# Patient Record
Sex: MALE | Race: WHITE | Employment: FULL TIME | URBAN - METROPOLITAN AREA
[De-identification: names, ages, dates, MRNs, and addresses within clinical notes are randomized per-mention and may not be internally consistent; named-entity substitution may affect disease eponyms.]

---

## 2017-03-02 ENCOUNTER — OFFICE VISIT (OUTPATIENT)
Dept: FAMILY MEDICINE CLINIC | Age: 42
End: 2017-03-02

## 2017-03-02 VITALS
HEIGHT: 72 IN | TEMPERATURE: 96.7 F | SYSTOLIC BLOOD PRESSURE: 125 MMHG | RESPIRATION RATE: 16 BRPM | WEIGHT: 168 LBS | HEART RATE: 62 BPM | OXYGEN SATURATION: 100 % | DIASTOLIC BLOOD PRESSURE: 75 MMHG | BODY MASS INDEX: 22.75 KG/M2

## 2017-03-02 DIAGNOSIS — R55 VASOVAGAL EPISODE: Primary | ICD-10-CM

## 2017-03-02 RX ORDER — EFINACONAZOLE 100 MG/ML
SOLUTION TOPICAL
COMMUNITY
Start: 2017-02-16 | End: 2018-08-29 | Stop reason: ALTCHOICE

## 2017-03-02 NOTE — PROGRESS NOTES
No previous tinnitus except with loud music. No h/o syncope. Lots of stress at work. Not a stressful conversation. Not hot. Past few weeks heightened stress from promotion and more work. Pt denies feeling panicked at time of incidence. Had sudden weakness and tunnel vision and resolved w/i seconds. Recurred w/i a few minutes lasted seconds but second episode became diaphoretic. Went to sit down. BP in clinic was low nml. Nurse history read and confirmed by patient. Visit Vitals    /75 (BP 1 Location: Left arm, BP Patient Position: Sitting)    Pulse 62    Temp 96.7 °F (35.9 °C)    Resp 16    Ht 6' (1.829 m)    Wt 168 lb (76.2 kg)    SpO2 100%    BMI 22.78 kg/m2       Patient alert and cooperative. Ears - canals, TMs clear. Assessment:  1. History consistent with vasovagal episode. Plan:  1. No specific therapy. Advised to keep well hydrated, can salt food to keep blood pressure up. 2. If recurrent episode will need cardiac workup. 3. Follow here otherwise prn.

## 2017-03-02 NOTE — PROGRESS NOTES
No pain but has a ringing in his left ear for several weeks  Had an issue last week at work and went to his company health center for dizziness-has copy of EKG they did. Was standing talking to a co-worker when he got sweaty-dizzy and weak in his knees with \"tunnel vision\". This lasted only a brief period of time but had 2 quick episodes together. No chest pain with this episode.  Fax request for the record sent to fax number [phone]  Right to Dec[employer] booklet to patient  Non smoker with no plans to start

## 2018-08-22 ENCOUNTER — LAB ONLY (OUTPATIENT)
Dept: FAMILY MEDICINE CLINIC | Age: 43
End: 2018-08-22

## 2018-08-22 DIAGNOSIS — Z00.00 LABORATORY EXAM ORDERED AS PART OF ROUTINE GENERAL MEDICAL EXAMINATION: ICD-10-CM

## 2018-08-22 DIAGNOSIS — E55.9 VITAMIN D DEFICIENCY: ICD-10-CM

## 2018-08-22 DIAGNOSIS — E78.00 PURE HYPERCHOLESTEROLEMIA: Primary | ICD-10-CM

## 2018-08-23 ENCOUNTER — TELEPHONE (OUTPATIENT)
Dept: FAMILY MEDICINE CLINIC | Age: 43
End: 2018-08-23

## 2018-08-23 LAB
25(OH)D3+25(OH)D2 SERPL-MCNC: 34.8 NG/ML (ref 30–100)
ALBUMIN SERPL-MCNC: 4.8 G/DL (ref 3.5–5.5)
ALBUMIN/GLOB SERPL: 2.5 {RATIO} (ref 1.2–2.2)
ALP SERPL-CCNC: 64 IU/L (ref 39–117)
ALT SERPL-CCNC: 16 IU/L (ref 0–44)
APPEARANCE UR: CLEAR
AST SERPL-CCNC: 15 IU/L (ref 0–40)
BASOPHILS # BLD AUTO: 0 X10E3/UL (ref 0–0.2)
BASOPHILS NFR BLD AUTO: 1 %
BILIRUB SERPL-MCNC: 0.4 MG/DL (ref 0–1.2)
BILIRUB UR QL STRIP: NEGATIVE
BUN SERPL-MCNC: 15 MG/DL (ref 6–24)
BUN/CREAT SERPL: 12 (ref 9–20)
CALCIUM SERPL-MCNC: 9.4 MG/DL (ref 8.7–10.2)
CHLORIDE SERPL-SCNC: 102 MMOL/L (ref 96–106)
CHOLEST SERPL-MCNC: 193 MG/DL (ref 100–199)
CO2 SERPL-SCNC: 23 MMOL/L (ref 20–29)
COLOR UR: YELLOW
CREAT SERPL-MCNC: 1.23 MG/DL (ref 0.76–1.27)
EOSINOPHIL # BLD AUTO: 0.2 X10E3/UL (ref 0–0.4)
EOSINOPHIL NFR BLD AUTO: 5 %
ERYTHROCYTE [DISTWIDTH] IN BLOOD BY AUTOMATED COUNT: 14.2 % (ref 12.3–15.4)
GLOBULIN SER CALC-MCNC: 1.9 G/DL (ref 1.5–4.5)
GLUCOSE SERPL-MCNC: 92 MG/DL (ref 65–99)
GLUCOSE UR QL: NEGATIVE
HCT VFR BLD AUTO: 46.5 % (ref 37.5–51)
HDLC SERPL-MCNC: 53 MG/DL
HGB BLD-MCNC: 15 G/DL (ref 13–17.7)
HGB UR QL STRIP: NEGATIVE
IMM GRANULOCYTES # BLD: 0 X10E3/UL (ref 0–0.1)
IMM GRANULOCYTES NFR BLD: 0 %
INTERPRETATION, 910389: NORMAL
KETONES UR QL STRIP: NEGATIVE
LDLC SERPL CALC-MCNC: 118 MG/DL (ref 0–99)
LEUKOCYTE ESTERASE UR QL STRIP: NEGATIVE
LYMPHOCYTES # BLD AUTO: 1.5 X10E3/UL (ref 0.7–3.1)
LYMPHOCYTES NFR BLD AUTO: 36 %
MCH RBC QN AUTO: 28 PG (ref 26.6–33)
MCHC RBC AUTO-ENTMCNC: 32.3 G/DL (ref 31.5–35.7)
MCV RBC AUTO: 87 FL (ref 79–97)
MICRO URNS: NORMAL
MONOCYTES # BLD AUTO: 0.3 X10E3/UL (ref 0.1–0.9)
MONOCYTES NFR BLD AUTO: 8 %
NEUTROPHILS # BLD AUTO: 2.1 X10E3/UL (ref 1.4–7)
NEUTROPHILS NFR BLD AUTO: 50 %
NITRITE UR QL STRIP: NEGATIVE
PH UR STRIP: 5 [PH] (ref 5–7.5)
PLATELET # BLD AUTO: 197 X10E3/UL (ref 150–379)
POTASSIUM SERPL-SCNC: 4.1 MMOL/L (ref 3.5–5.2)
PROT SERPL-MCNC: 6.7 G/DL (ref 6–8.5)
PROT UR QL STRIP: NEGATIVE
RBC # BLD AUTO: 5.36 X10E6/UL (ref 4.14–5.8)
SODIUM SERPL-SCNC: 142 MMOL/L (ref 134–144)
SP GR UR: 1.02 (ref 1–1.03)
TRIGL SERPL-MCNC: 112 MG/DL (ref 0–149)
TSH SERPL DL<=0.005 MIU/L-ACNC: 1.7 UIU/ML (ref 0.45–4.5)
UROBILINOGEN UR STRIP-MCNC: 0.2 MG/DL (ref 0.2–1)
VLDLC SERPL CALC-MCNC: 22 MG/DL (ref 5–40)
WBC # BLD AUTO: 4.1 X10E3/UL (ref 3.4–10.8)

## 2018-08-24 NOTE — TELEPHONE ENCOUNTER
Writer called patient regarding lab results. Patient did not answer. Writer left  requesting phone call back.

## 2018-08-24 NOTE — TELEPHONE ENCOUNTER
Spoke with patient after obtaining 2 patient identifiers  Lab results given.  Patient verbalized understanding

## 2018-08-29 ENCOUNTER — OFFICE VISIT (OUTPATIENT)
Dept: FAMILY MEDICINE CLINIC | Age: 43
End: 2018-08-29

## 2018-08-29 VITALS
HEART RATE: 64 BPM | BODY MASS INDEX: 22.37 KG/M2 | OXYGEN SATURATION: 98 % | WEIGHT: 165.2 LBS | HEIGHT: 72 IN | SYSTOLIC BLOOD PRESSURE: 125 MMHG | RESPIRATION RATE: 18 BRPM | TEMPERATURE: 96.5 F | DIASTOLIC BLOOD PRESSURE: 75 MMHG

## 2018-08-29 DIAGNOSIS — E55.9 VITAMIN D DEFICIENCY: ICD-10-CM

## 2018-08-29 DIAGNOSIS — E78.00 PURE HYPERCHOLESTEROLEMIA: ICD-10-CM

## 2018-08-29 DIAGNOSIS — Z00.00 PHYSICAL EXAM: Primary | ICD-10-CM

## 2018-08-29 DIAGNOSIS — Z82.49 FH: HEART ATTACK: ICD-10-CM

## 2018-08-29 NOTE — MR AVS SNAPSHOT
[address] [place] [address] 
 
 
 [id number] [place] [address] [address] [address] 
[phone] Patient: Keyona Fong MRN:  [id number] Visit Information Date & Time Provider Department Dept. Phone Encounter #  
 8/29/2018  3:40 PM Karen Raya MD [facility] [phone] [id number] Follow-up Instructions Return in about 2 years (around 8/29/2020) for [facility], [facility]. Upcoming Health Maintenance Date Due Influenza Age 5 to Adult 9/1/2019* DTaP/Tdap/Td series (2 - Td) 6/1/2026 *Topic was postponed. The date shown is not the original due date. Allergies as of 8/29/2018  Review Complete On: 8/29/2018 By: Karen Raya MD  
  
 Severity Noted Reaction Type Reactions Armond  08/29/2018    Rash Rash Current Immunizations  Reviewed on 6/1/2016 Name Date Hep A Vaccine 3/21/2004, 9/8/2003 Hep B Vaccine 3/1/2004 Influenza Vaccine 12/14/2016 12:00 AM, 10/17/2014 12:00 AM  
 Td 9/8/2003 Tdap 6/1/2016 Not reviewed this visit You Were Diagnosed With   
  
 Codes Comments Physical exam    -  Primary ICD-10-CM: Z00.00 ICD-9-CM: V70.9 Pure hypercholesterolemia     ICD-10-CM: E78.00 ICD-9-CM: 272.0 Vitamin D deficiency     ICD-10-CM: E55.9 ICD-9-CM: 268.9 FH: heart attack     ICD-10-CM: Z82.49 
ICD-9-CM: V17.3 Vitals BP Pulse Temp Resp Height(growth percentile) Weight(growth percentile) 125/75 (BP 1 Location: Right arm, BP Patient Position: Sitting) 64 96.5 °F (35.8 °C) (Oral) 18 6' (1.829 m) 165 lb 3.2 oz (74.9 kg) SpO2 BMI Smoking Status 98% 22.41 kg/m2 Never Smoker BMI and BSA Data Body Mass Index Body Surface Area  
 22.41 kg/m 2 1.95 m 2 Preferred Pharmacy Pharmacy Name Phone [id number] [place] [phone] Your Updated Medication List  
  
   
 This list is accurate as of 8/29/18  4:10 PM.  Always use your most recent med list. KRILL OIL PO Take  by mouth. VITAMIN D3 1,000 unit tablet Generic drug:  cholecalciferol Take  by mouth daily. Follow-up Instructions Return in about 2 years (around 8/29/2020) for [facility], labs. [facility]! Dear NIDHI: Thank you for requesting a [employer] account. Our records indicate that you already have an active [employer] account. You can access your account anytime at [email or web address] Did you know that you can access your hospital and ER discharge instructions at any time in [employer]? You can also review all of your test results from your hospital stay or ER visit. Additional Information If you have questions, please visit the Frequently Asked Questions section of the [employer] website at [email or web address] Remember, [employer] is NOT to be used for urgent needs. For medical emergencies, dial 911. Now available from your iPhone and Android! Please provide this summary of care documentation to your next provider. Your primary care clinician is listed as [id number] If you have any questions after today's visit, please call [phone].

## 2018-08-29 NOTE — PROGRESS NOTES
HISTORY OF PRESENT ILLNESS Raúl Cheng is a 39 y.o. male. HPI Here for Health system. 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 No colonoscopy yet. Had heart scan '16 with 0 score. No signif hx past yr. Patient Active Problem List  
Diagnosis Code  Pure hypercholesterolemia E78.00  Vitamin D deficiency E55.9  FH: heart attack Z82.49 Current Outpatient Prescriptions Medication Sig Dispense Refill  cholecalciferol (VITAMIN D3) 1,000 unit tablet Take  by mouth daily.  KRILL OIL PO Take  by mouth. Allergies Allergen Reactions  Armond Rash Rash Past Medical History:  
Diagnosis Date  Dizziness and giddiness 02/22/2017  
 episode at work and notes from [address] [place] [address] rec'd/ekg done  Hemorrhoid  History of chicken pox  Panic attacks  Vitamin D deficiency 3/2013  Work-related stress 3/2013 History reviewed. No pertinent surgical history. Family History Problem Relation Age of Onset  Hypertension Father  Heart Disease Father 46 MI  
 Diabetes Father  Hypertension Brother  Heart Disease Maternal Grandmother  Cancer Paternal Grandmother Social History Substance Use Topics  Smoking status: Never Smoker  Smokeless tobacco: Never Used  Alcohol use 2.5 oz/week  
  5 Glasses of wine per week Lab Results Component Value Date/Time WBC 4.1 08/22/2018 08:51 AM  
HGB 15.0 08/22/2018 08:51 AM  
HCT 46.5 08/22/2018 08:51 AM  
PLATELET 358 24/83/1629 08:51 AM  
MCV 87 08/22/2018 08:51 AM  
 
Lab Results Component Value Date/Time Glucose 92 08/22/2018 08:51 AM  
LDL, calculated 118 (H) 08/22/2018 08:51 AM  
Creatinine 1.23 08/22/2018 08:51 AM  
  
Lab Results Component Value Date/Time Cholesterol, total 193 08/22/2018 08:51 AM  
HDL Cholesterol 53 08/22/2018 08:51 AM  
LDL, calculated 118 (H) 08/22/2018 08:51 AM  
Triglyceride 112 08/22/2018 08:51 AM  
CHOL/HDL Ratio 3.2 07/22/2009 09:26 AM  
 
Lab Results Component Value Date/Time ALT (SGPT) 16 08/22/2018 08:51 AM  
AST (SGOT) 15 08/22/2018 08:51 AM  
Alk. phosphatase 64 08/22/2018 08:51 AM  
Bilirubin, total 0.4 08/22/2018 08:51 AM  
Albumin 4.8 08/22/2018 08:51 AM  
Protein, total 6.7 08/22/2018 08:51 AM  
PLATELET 817 89/90/6772 08:51 AM  
 
 
Lab Results Component Value Date/Time GFR est non-AA 71 08/22/2018 08:51 AM  
GFR est AA 82 08/22/2018 08:51 AM  
Creatinine 1.23 08/22/2018 08:51 AM  
BUN 15 08/22/2018 08:51 AM  
Sodium 142 08/22/2018 08:51 AM  
Potassium 4.1 08/22/2018 08:51 AM  
Chloride 102 08/22/2018 08:51 AM  
CO2 23 08/22/2018 08:51 AM  
 
Lab Results Component Value Date/Time TSH 1.700 08/22/2018 08:51 AM  
  
Lab Results Component Value Date/Time Glucose 92 08/22/2018 08:51 AM  
   
 
Review of Systems Constitutional: Negative. HENT: Positive for tinnitus. Eyes: Negative. Respiratory: Negative. Cardiovascular: Negative. Gastrointestinal: Negative. Genitourinary: Negative. Musculoskeletal: Negative. Skin: Negative. Neurological: Negative. Endo/Heme/Allergies: Positive for environmental allergies. Psychiatric/Behavioral: Negative. Physical Exam  
Vitals:  
 08/29/18 1546 BP: 125/75 Pulse: 64 Resp: 18 Temp: 96.5 °F (35.8 °C) TempSrc: Oral  
SpO2: 98% Weight: 165 lb 3.2 oz (74.9 kg) Height: 6' (1.829 m) General  alert, cooperative, no distress, appears stated age Head  Normocephalic, without obvious abnormality, atraumatic Eyes  conjunctivae/corneas clear. PERRL. Fundi benign Ears  Canals and TMs clear Nose Passages patent. Mucosa normal. No drainage or sinus tenderness. Throat Lips, mucosa, and tongue normal. Teeth and gums normal.  Post pharynx neg. Neck supple, nontender, no adenopathy, thyroid: not enlarged, no masses/nodules, no carotid bruits Back   symmetric, no curvature. FROM. No CVA tenderness Lungs   clear to auscultation bilaterally Chest wall  no tenderness Heart  regular rate and rhythm, no murmur, click, rub or gallop Abdomen   soft, non-tender. Bowel sounds normal. No masses,  No organomegaly Genitalia  Testes descended bilat w/o masses. No hernias Rectal  Normal tone, normal prostate, no masses or tenderness Extremities extremities normal, atraumatic, no cyanosis or edema Pulses 2+ and symmetric Skin No rashes or lesions Neurologic Romberg neg, nml heel, toe and Tandem gait. DTRs 2+ symmetric ASSESSMENT and PLAN 
  ICD-10-CM ICD-9-CM 1. Physical exam Z00.00 V70.9 2. Pure hypercholesterolemia E78.00 272.0 3. Vitamin D deficiency E55.9 268.9   
4. FH: heart attack Z82.49 V17.3 Follow-up Disposition: 
Return in about 2 years (around 8/29/2020) for [facility], labs.

## 2018-08-29 NOTE — PROGRESS NOTES
Lupe Corrales  Identified pt with two pt identifiers(name and ). Chief Complaint Patient presents with  Complete Physical  
  rm1/ non fasting 1. Have you been to the ER, urgent care clinic since your last visit? no  Hospitalized since your last visit? No 
 
2. Have you seen or consulted any other health care providers outside of the [address] [place] [address] since your last visit? Include any pap smears or colon screening. No 
 
Today's provider has been notified of reason for visit, vitals and flowsheets obtained on patients. Reviewed record In preparation for visit, huddled with provider and have obtained necessary documentation Health Maintenance Due Topic  Influenza Age 5 to Adult Wt Readings from Last 3 Encounters:  
18 165 lb 3.2 oz (74.9 kg) 17 168 lb (76.2 kg) 16 165 lb 14.2 oz (75.2 kg) Temp Readings from Last 3 Encounters:  
18 96.5 °F (35.8 °C) (Oral) 17 96.7 °F (35.9 °C)  
16 98.7 °F (37.1 °C) BP Readings from Last 3 Encounters:  
18 125/75  
17 125/75  
16 120/67 Pulse Readings from Last 3 Encounters:  
18 64  
17 62  
16 73 Vitals:  
 18 1546 BP: 125/75 Pulse: 64 Resp: 18 Temp: 96.5 °F (35.8 °C) TempSrc: Oral  
SpO2: 98% Weight: 165 lb 3.2 oz (74.9 kg) Height: 6' (1.829 m) PainSc:   0 - No pain Learning Assessment: 
:  
 
Learning Assessment 2014 PRIMARY LEARNER Patient HIGHEST LEVEL OF EDUCATION - PRIMARY LEARNER  > 4 YEARS OF COLLEGE  
BARRIERS PRIMARY LEARNER NONE  
CO-LEARNER CAREGIVER No  
PRIMARY LANGUAGE ENGLISH  
LEARNER PREFERENCE PRIMARY READING  
ANSWERED BY patient RELATIONSHIP SELF Depression Screening: 
:  
 
PHQ over the last two weeks 2018 Little interest or pleasure in doing things Not at all Feeling down, depressed, irritable, or hopeless Not at all Total Score PHQ 2 0  
 
 
 Fall Risk Assessment: 
:  
 
No flowsheet data found. Abuse Screening: 
:  
 
Abuse Screening Questionnaire 8/29/2018 Do you ever feel afraid of your partner? Emily Arciniega Are you in a relationship with someone who physically or mentally threatens you? Emily Arciniega Is it safe for you to go home? Y  
 
 
ADL Screening: 
:  
 
No flowsheet data found. Medication reconciliation up to date and corrected with patient at this time.

## 2019-07-31 ENCOUNTER — OFFICE VISIT (OUTPATIENT)
Dept: FAMILY MEDICINE CLINIC | Age: 44
End: 2019-07-31

## 2019-07-31 VITALS
RESPIRATION RATE: 18 BRPM | WEIGHT: 167.3 LBS | BODY MASS INDEX: 22.66 KG/M2 | TEMPERATURE: 98.3 F | HEART RATE: 71 BPM | HEIGHT: 72 IN | DIASTOLIC BLOOD PRESSURE: 78 MMHG | SYSTOLIC BLOOD PRESSURE: 117 MMHG | OXYGEN SATURATION: 97 %

## 2019-07-31 DIAGNOSIS — M54.2 POSTERIOR NECK PAIN: Primary | ICD-10-CM

## 2019-07-31 NOTE — PROGRESS NOTES
Trying to dead lift 100+ pounds. First time lifted weight. Shooting pain base of skull into both sides of jaw. No arm sxs. Sharp pain 15-20 minutes. Used ice, ibuprofen. Notes pain AM, PM.  Not doing anything for it at present. Episode 2 weeks ago. Patient denies chest pain, dyspnea, unexpected weight change, unexpected pain, mood or memory changes. Visit Vitals  /78 (BP 1 Location: Right arm, BP Patient Position: Sitting)   Pulse 71   Temp 98.3 °F (36.8 °C) (Oral)   Resp 18   Ht 6' (1.829 m)   Wt 167 lb 4.8 oz (75.9 kg)   SpO2 97%   BMI 22.69 kg/m²       Patient alert and cooperative. Neck with full ROM without symptoms. No palpation tenderness. No muscle spasticity. No spinal tenderness. Assessment:  1. Posterior acute neck pain, probable muscular strain. Plan:  1. Moist heat, anti-inflammatories. 2. Follow up in 7-10 days if still symptomatic for PT referral.  3. Recheck here otherwise prn.

## 2019-07-31 NOTE — PROGRESS NOTES
Ang Abel  Identified pt with two pt identifiers(name and ). Chief Complaint   Patient presents with    Neck Pain     base of skull; started about 2 weeks ago; was trying to lift a weight and then a sudden, \"excruiating\" pain started and radiated to his jaw; that did last a little awhile       1. Have you been to the ER, urgent care clinic since your last visit? Hospitalized since your last visit? No    2. Have you seen or consulted any other health care providers outside of the [address] [place] [address] since your last visit? Include any pap smears or colon screening. No      Would you like to sign up for MyChart today, if you have not already done so? Patient has a mychart  If not, would you like information on MyChart, and how to sign up at a later time? No      Medication reconciliation up to date and corrected with patient at this time. Today's provider has been notified of reason for visit, vitals and flowsheets obtained on patients. Reviewed record in preparation for visit, huddled with provider and have obtained necessary documentation. There are no preventive care reminders to display for this patient.     Wt Readings from Last 3 Encounters:   19 167 lb 4.8 oz (75.9 kg)   18 165 lb 3.2 oz (74.9 kg)   17 168 lb (76.2 kg)     Temp Readings from Last 3 Encounters:   19 98.3 °F (36.8 °C) (Oral)   18 96.5 °F (35.8 °C) (Oral)   17 96.7 °F (35.9 °C)     BP Readings from Last 3 Encounters:   19 117/78   18 125/75   17 125/75     Pulse Readings from Last 3 Encounters:   19 71   18 64   17 62     Vitals:    19 1433   BP: 117/78   Pulse: 71   Resp: 18   Temp: 98.3 °F (36.8 °C)   TempSrc: Oral   SpO2: 97%   Weight: 167 lb 4.8 oz (75.9 kg)   Height: 6' (1.829 m)   PainSc:   1   PainLoc: Neck         Learning Assessment:  :     Learning Assessment 2014   PRIMARY LEARNER Patient   HIGHEST LEVEL OF EDUCATION - PRIMARY LEARNER  > 4 YEARS OF COLLEGE   BARRIERS PRIMARY LEARNER NONE   CO-LEARNER CAREGIVER No   PRIMARY LANGUAGE ENGLISH   LEARNER PREFERENCE PRIMARY READING   ANSWERED BY patient   RELATIONSHIP SELF       Depression Screening:  :     3 most recent PHQ Screens 7/31/2019   Little interest or pleasure in doing things Not at all   Feeling down, depressed, irritable, or hopeless Not at all   Total Score PHQ 2 0       Fall Risk Assessment:  :     Fall Risk Assessment, last 12 mths 7/31/2019   Able to walk? Yes   Fall in past 12 months? No       Abuse Screening:  :     Abuse Screening Questionnaire 7/31/2019 8/29/2018   Do you ever feel afraid of your partner? N N   Are you in a relationship with someone who physically or mentally threatens you? N N   Is it safe for you to go home?  Y Y       ADL Screening:  :     ADL Assessment 7/31/2019   Feeding yourself No Help Needed   Getting from bed to chair No Help Needed   Getting dressed No Help Needed   Bathing or showering No Help Needed   Walk across the room (includes cane/walker) No Help Needed   Using the telphone No Help Needed   Taking your medications No Help Needed   Preparing meals No Help Needed   Managing money (expenses/bills) No Help Needed   Moderately strenuous housework (laundry) No Help Needed   Shopping for personal items (toiletries/medicines) No Help Needed   Shopping for groceries No Help Needed   Driving No Help Needed   Climbing a flight of stairs No Help Needed   Getting to places beyond walking distances No Help Needed

## 2020-12-01 ENCOUNTER — VIRTUAL VISIT (OUTPATIENT)
Dept: FAMILY MEDICINE CLINIC | Age: 45
End: 2020-12-01
Payer: COMMERCIAL

## 2020-12-01 DIAGNOSIS — E78.00 PURE HYPERCHOLESTEROLEMIA: ICD-10-CM

## 2020-12-01 DIAGNOSIS — Z00.00 LABORATORY EXAM ORDERED AS PART OF ROUTINE GENERAL MEDICAL EXAMINATION: ICD-10-CM

## 2020-12-01 DIAGNOSIS — Z82.49 FH: HEART ATTACK: ICD-10-CM

## 2020-12-01 DIAGNOSIS — E55.9 VITAMIN D DEFICIENCY: ICD-10-CM

## 2020-12-01 DIAGNOSIS — M25.511 CHRONIC RIGHT SHOULDER PAIN: Primary | ICD-10-CM

## 2020-12-01 DIAGNOSIS — G89.29 CHRONIC RIGHT SHOULDER PAIN: Primary | ICD-10-CM

## 2020-12-01 DIAGNOSIS — F43.9 STRESS: ICD-10-CM

## 2020-12-01 DIAGNOSIS — R14.0 ABDOMINAL BLOATING: ICD-10-CM

## 2020-12-01 PROCEDURE — 99213 OFFICE O/P EST LOW 20 MIN: CPT | Performed by: FAMILY MEDICINE

## 2020-12-01 NOTE — PROGRESS NOTES
Renetta Garcia is a 54 y.o. male  HIPAA verified by two patient identifiers. Health Maintenance Due   Topic    Flu Vaccine (1)     Chief Complaint   Patient presents with    Shoulder Pain     No flowsheet data found. Pain Scale: /10  Pain Location:               1. Have you been to the ER, urgent care clinic since your last visit? Hospitalized since your last visit? No    2. Have you seen or consulted any other health care providers outside of the [address] [place] [address] since your last visit? Include any pap smears or colon screening.  No     Doxy visit # [phone]

## 2020-12-01 NOTE — PROGRESS NOTES
**THIS IS A VIRTUAL VISIT VIA A VIDEO SYNCHRONOUS DISCUSSION OVER DOXY. ME PATIENT AGREED TO HAVE THEIR CARE DELIVERED OVER A [employer] VIDEO VISIT IN PLACE OF THEIR REGULARLY SCHEDULED OFFICE VISIT**       Kate Marinelli is a 54 y.o. male who was seen by synchronous (real-time) audio-video technology on 12/1/2020 for Shoulder Pain        Assessment & Plan:   Diagnoses and all orders for this visit:    1. Chronic right shoulder pain  -     REFERRAL TO ORTHOPEDICS    2. Pure hypercholesterolemia  -     LIPID PANEL; Future    3. FH: heart attack  -     LIPID PANEL; Future    4. Vitamin D deficiency  -     VITAMIN D, 25 HYDROXY; Future    5. Laboratory exam ordered as part of routine general medical examination  -     VITAMIN D, 25 HYDROXY; Future  -     CBC WITH AUTOMATED DIFF; Future  -     URINALYSIS W/ RFLX MICROSCOPIC; Future  -     TSH 3RD GENERATION; Future  -     METABOLIC PANEL, COMPREHENSIVE; Future  -     LIPID PANEL; Future    6. Abdominal bloating    7. Stress            Subjective:     Tried going back to gym 2 mos ago and return of pain front of right shoulder. Past 3 weeks more bloating and mild lower abdm pain. Has been walking. BMs same. Eating lots of fruits and veggies. Inc stress with work. Prior to Admission medications    Medication Sig Start Date End Date Taking? Authorizing Provider   cholecalciferol (VITAMIN D3) 1,000 unit tablet Take  by mouth daily. Yes Provider, Historical   KRILL OIL PO Take  by mouth. Yes Provider, Historical     Patient Active Problem List   Diagnosis Code    Pure hypercholesterolemia E78.00    Vitamin D deficiency E55.9    FH: heart attack Z82.49    Chronic right shoulder pain M25.511, G89.29    Stress F43.9     Current Outpatient Medications   Medication Sig Dispense Refill    cholecalciferol (VITAMIN D3) 1,000 unit tablet Take  by mouth daily.  KRILL OIL PO Take  by mouth.        Allergies   Allergen Reactions    Armond Rash     Rash     Past Medical History:   Diagnosis Date    Dizziness and giddiness 02/22/2017    episode at work and notes from [facility] rec'd/ekg done   [address] [facility] History of chicken pox     Panic attacks     Vitamin D deficiency 3/2013    Work-related stress 3/2013     No past surgical history on file. Family History   Problem Relation Age of Onset    Hypertension Father     Heart Disease Father 46        MI    Diabetes Father     Hypertension Brother     Heart Disease Maternal Grandmother     Cancer Paternal Grandmother      Social History     Tobacco Use    Smoking status: Never Smoker    Smokeless tobacco: Never Used   Substance Use Topics    Alcohol use: Yes     Alcohol/week: 4.2 standard drinks     Types: 5 Glasses of wine per week       ROS    Objective:   No flowsheet data found.      [INSTRUCTIONS:  \"[x]\" Indicates a positive item  \"[]\" Indicates a negative item  -- DELETE ALL ITEMS NOT EXAMINED]    Constitutional: [x] Appears well-developed and well-nourished [x] No apparent distress      [] Abnormal -     Mental status: [x] Alert and awake  [x] Oriented to person/place/time [x] Able to follow commands    [] Abnormal -     Eyes:   EOM    [x]  Normal    [] Abnormal -   Sclera  [x]  Normal    [] Abnormal -          Discharge [x]  None visible   [] Abnormal -     HENT: [x] Normocephalic, atraumatic  [] Abnormal -   [x] Mouth/Throat: Mucous membranes are moist    External Ears [x] Normal  [] Abnormal -    Neck: [x] No visualized mass [] Abnormal -     Pulmonary/Chest: [x] Respiratory effort normal   [x] No visualized signs of difficulty breathing or respiratory distress        [] Abnormal -      Neurological:        [x] No Facial Asymmetry (Cranial nerve 7 motor function) (limited exam due to video visit)          [x] No gaze palsy        [] Abnormal -          Skin:        [x] No significant exanthematous lesions or discoloration noted on facial skin         [] Abnormal -            Psychiatric: [x] Normal Affect [] Abnormal -        [x] No Hallucinations    Other pertinent observable physical exam findings:-        We discussed the expected course, resolution and complications of the diagnosis(es) in detail. Medication risks, benefits, costs, interactions, and alternatives were discussed as indicated. I advised him to contact the office if his condition worsens, changes or fails to improve as anticipated. He expressed understanding with the diagnosis(es) and plan. Flory Angeles, who was evaluated through a patient-initiated, synchronous (real-time) audio-video encounter, and/or his healthcare decision maker, is aware that it is a billable service, with coverage as determined by his insurance carrier. He provided verbal consent to proceed: Yes, and patient identification was verified. It was conducted pursuant to the emergency declaration under the [address] [place] [address], [address] [place] [address] authority and the [employer] Act. A caregiver was present when appropriate. Ability to conduct physical exam was limited. I was at home. The patient was at home.       Roberto Carlos Reyez MD

## 2022-03-19 PROBLEM — G89.29 CHRONIC RIGHT SHOULDER PAIN: Status: ACTIVE | Noted: 2020-12-01

## 2022-03-19 PROBLEM — F43.9 STRESS: Status: ACTIVE | Noted: 2020-12-01

## 2022-03-19 PROBLEM — M25.511 CHRONIC RIGHT SHOULDER PAIN: Status: ACTIVE | Noted: 2020-12-01

## 2023-06-13 NOTE — TELEPHONE ENCOUNTER
----- Message from Mervin Davison sent at 8/24/2018  1:14 PM EDT -----  Regarding: Dr. Anastacio Diaan returning missed call.  Best contact [phone] [No Acute Distress] : no acute distress [Well Nourished] : well nourished [Well Developed] : well developed [Well-Appearing] : well-appearing [Normal Sclera/Conjunctiva] : normal sclera/conjunctiva [EOMI] : extraocular movements intact [Normal Outer Ear/Nose] : the outer ears and nose were normal in appearance [Normal Oropharynx] : the oropharynx was normal [Normal TMs] : both tympanic membranes were normal [No JVD] : no jugular venous distention [No Lymphadenopathy] : no lymphadenopathy [Supple] : supple [No Respiratory Distress] : no respiratory distress  [No Accessory Muscle Use] : no accessory muscle use [Clear to Auscultation] : lungs were clear to auscultation bilaterally [Normal Rate] : normal rate  [Regular Rhythm] : with a regular rhythm [Normal S1, S2] : normal S1 and S2 [No Murmur] : no murmur heard [No Carotid Bruits] : no carotid bruits [Pedal Pulses Present] : the pedal pulses are present [No Edema] : there was no peripheral edema [Soft] : abdomen soft [Non Tender] : non-tender [Non-distended] : non-distended [No Masses] : no abdominal mass palpated [Normal Bowel Sounds] : normal bowel sounds [Normal Posterior Cervical Nodes] : no posterior cervical lymphadenopathy [Normal Anterior Cervical Nodes] : no anterior cervical lymphadenopathy [No CVA Tenderness] : no CVA  tenderness [No Spinal Tenderness] : no spinal tenderness [No Joint Swelling] : no joint swelling [Grossly Normal Strength/Tone] : grossly normal strength/tone [No Rash] : no rash [No Focal Deficits] : no focal deficits [Normal Gait] : normal gait [Deep Tendon Reflexes (DTR)] : deep tendon reflexes were 2+ and symmetric [Normal Affect] : the affect was normal [Normal Insight/Judgement] : insight and judgment were intact [de-identified] : + moles

## 2023-10-30 ENCOUNTER — HOSPITAL ENCOUNTER (OUTPATIENT)
Age: 48
Discharge: HOME | End: 2023-11-02

## 2023-10-30 DIAGNOSIS — Z13.6 SCREENING FOR HEART DISEASE: ICD-10-CM

## 2023-10-30 LAB
VAS AORTA DIST AP: 1.5 CM
VAS AORTA MID AP: 1.5 CM
VAS AORTA PROX AP: 1.9 CM
VAS LEFT ABI: 1.05
VAS LEFT ARM BP: 120 MMHG
VAS LEFT DORSALIS PEDIS BP: 126 MMHG
VAS LEFT ICA/CCA PSV: 0.8
VAS LEFT PTA BP: 124 MMHG
VAS RIGHT ABI: 1.18
VAS RIGHT ARM BP: 114 MMHG
VAS RIGHT DORSALIS PEDIS BP: 141 MMHG
VAS RIGHT ICA/CCA PSV: 0.9
VAS RIGHT PTA BP: 128 MMHG

## 2023-10-30 PROCEDURE — 75571 CT HRT W/O DYE W/CA TEST: CPT

## 2023-10-30 PROCEDURE — 9900000021 VAS SCREENING (CAROTID/ABDOMINAL/ABI LTD)
